# Patient Record
Sex: FEMALE | Race: WHITE | Employment: UNEMPLOYED | URBAN - NONMETROPOLITAN AREA
[De-identification: names, ages, dates, MRNs, and addresses within clinical notes are randomized per-mention and may not be internally consistent; named-entity substitution may affect disease eponyms.]

---

## 2021-07-09 ENCOUNTER — OFFICE VISIT (OUTPATIENT)
Dept: FAMILY MEDICINE CLINIC | Age: 51
End: 2021-07-09

## 2021-07-09 VITALS
RESPIRATION RATE: 16 BRPM | OXYGEN SATURATION: 97 % | TEMPERATURE: 97.5 F | SYSTOLIC BLOOD PRESSURE: 130 MMHG | BODY MASS INDEX: 27.46 KG/M2 | WEIGHT: 155 LBS | DIASTOLIC BLOOD PRESSURE: 82 MMHG | HEIGHT: 63 IN | HEART RATE: 85 BPM

## 2021-07-09 DIAGNOSIS — R11.2 NON-INTRACTABLE VOMITING WITH NAUSEA, UNSPECIFIED VOMITING TYPE: ICD-10-CM

## 2021-07-09 DIAGNOSIS — R10.31 RLQ ABDOMINAL PAIN: Primary | ICD-10-CM

## 2021-07-09 DIAGNOSIS — R10.11 RUQ PAIN: ICD-10-CM

## 2021-07-09 PROCEDURE — 99214 OFFICE O/P EST MOD 30 MIN: CPT | Performed by: NURSE PRACTITIONER

## 2021-07-09 NOTE — PROGRESS NOTES
Chief Complaint:       Nausea and Nausea & Vomiting (patient has been throwing up all liquids since yesterday)      History of Present Illness   Source of history provided by:  patient. David Lemos is a 45 y.o. old female who presents to walk-in for complaints of sudden onset dull pain right lower abdomen radiating to back which began 1 day(s) prior to arrival.   There has been no similar episodes in the past.  Since onset the symptoms have been persistent. The pain is associated with nausea and vomiting. The pain is aggravated by eating and fluids and relieved by nothing. She has been unable to keep fluids down for 24 hours. There has been NO back pain, chills, cloudy urine, constipation, diarrhea, dysuria, headache, hematuria, sweating, urinary frequency, urinary incontinence, urinary urgency, vaginal discharge or vaginal itching. She has had no recent antibiotic use. No other sick contacts. She has not consumed any known contaminated foods. ROS    Unless otherwise stated in this report or unable to obtain because of the patient's clinical or mental status as evidenced by the medical record, this patients's positive and negative responses for Review of Systems, constitutional, psych, eyes, ENT, cardiovascular, respiratory, gastrointestinal, neurological, genitourinary, musculoskeletal, integument systems and systems related to the presenting problem are either stated in the preceding or were not pertinent or were negative for the symptoms and/or complaints related to the medical problem. Past Medical History:  has a past medical history of Anxiety, Bipolar disorder ([facility] [address] Depression, Elevated prolactin level, and Headache(784.0). Past Surgical History:  has a past surgical history that includes Hysterectomy. Social History:  reports that she has been smoking. She has a 15.00 pack-year smoking history.  She has never used smokeless tobacco. She reports that she does not drink alcohol and does not use drugs. Family History: family history includes Arthritis in her father and mother; Cancer in her father; Diabetes in her father. Allergies: Paroxetine, Paroxetine hcl, and Tetracyclines & related    Physical Exam   Vital Signs:  /82   Pulse 85   Temp 97.5 °F (36.4 °C)   Resp 16   Ht 5' 3\" (1.6 m)   Wt 155 lb (70.3 kg)   SpO2 97%   BMI 27.46 kg/m²    Oxygen Saturation Interpretation: Normal.    General Appearance/Constitutional:  Alert, development consistent with age, NAD. Lungs: CTAB without wheezing, rales, or rhonchi. Heart:  RRR, no murmurs, rubs, or gallops. Abdomen:         General Appearance: No obvious trauma or bruising. No rashes or lesions. Bowel sounds: Present in all quadrants       Distension:  No distension. Tenderness: Tenderness and guarding with palpation noted to the right lower quadrant and right upper quadrant. Liver/Spleen: Non-tender and no hepatosplenomegaly. Pulsatile Mass: None noted. Back: CVA Tenderness: No bilateral tenderness or bruising. Skin:  Normal turgor. Warm, dry, without visible rash, unless noted elsewhere. Neurological:  Orientation age-appropriate. Motor functions intact. Lab / Imaging Results   (All laboratory and radiology results have been personally reviewed by myself)  Labs:  No results found for this visit on 07/09/21. Imaging: All Radiology results interpreted by Radiologist unless otherwise noted. No results found. Assessment / Plan   Impression(s):  Neeta Bowman was seen today for nausea and nausea & vomiting. Diagnoses and all orders for this visit:    RLQ abdominal pain    RUQ pain    Non-intractable vomiting with nausea, unspecified vomiting type    Given the patient's clinical exam and symptoms patient was referred to the emergency department for further evaluation and imaging to rule out acute appendicitis and acute cholecystitis. She will go by private vehicle.  Pt states understanding and is in agreement with this care plan. All questions answered. Electronically signed by GOPI Rosario NP   DD: 7/9/21    **This report was transcribed using voice recognition software. Every effort was made to ensure accuracy; however, inadvertent computerized transcription errors may be present.